# Patient Record
Sex: MALE | URBAN - METROPOLITAN AREA
[De-identification: names, ages, dates, MRNs, and addresses within clinical notes are randomized per-mention and may not be internally consistent; named-entity substitution may affect disease eponyms.]

---

## 2017-11-08 ENCOUNTER — APPOINTMENT (OUTPATIENT)
Dept: URBAN - METROPOLITAN AREA SURGERY 9 | Age: 57
Setting detail: DERMATOLOGY
End: 2017-11-08

## 2017-11-08 VITALS — SYSTOLIC BLOOD PRESSURE: 136 MMHG | RESPIRATION RATE: 12 BRPM | HEART RATE: 62 BPM | DIASTOLIC BLOOD PRESSURE: 84 MMHG

## 2017-11-08 PROBLEM — C44.311 BASAL CELL CARCINOMA OF SKIN OF NOSE: Status: ACTIVE | Noted: 2017-11-08

## 2017-11-08 PROBLEM — Z85.828 PERSONAL HISTORY OF OTHER MALIGNANT NEOPLASM OF SKIN: Status: ACTIVE | Noted: 2017-11-08

## 2017-11-08 PROCEDURE — 17311 MOHS 1 STAGE H/N/HF/G: CPT

## 2017-11-08 PROCEDURE — OTHER MOHS SURGERY: OTHER

## 2017-11-08 PROCEDURE — OTHER RETURN TO REFERRING PROVIDER: OTHER

## 2017-11-08 PROCEDURE — OTHER CONSULTATION FOR MOHS SURGERY: OTHER

## 2017-11-08 NOTE — PROCEDURE: MOHS SURGERY
Body Location Override (Optional - Billing Will Still Be Based On Selected Body Map Location If Applicable): Left nasal sidewall

## 2017-11-08 NOTE — HPI: MOHS SURGERY CONSULTATION
Has The Cancer Been Biopsied Before?: has been previously biopsied
Who Is Your Referring Provider?: Dr. Galarza
When Was Your Biopsy?: 10/18/2017

## 2021-06-25 NOTE — PROCEDURE: MOHS SURGERY
Completed  Requested Prescriptions     Signed Prescriptions Disp Refills     HYDROcodone-acetaminophen (NORCO )  mg per tablet 40 tablet 0     Sig: Take 1 tablet by mouth every 4 (four) hours as needed for pain (max of 4/day and 40/28days).     Authorizing Provider: DANNI LYNCH        Purse String (Simple) Text: Given the location of the defect and the characteristics of the surrounding skin a pursestring closure was deemed most appropriate.  Undermining was performed circumfirentially around the surgical defect.  A purstring suture was then placed and tightened.

## 2025-06-02 NOTE — PROCEDURE: MOHS SURGERY
declines Skin Substitute Text: The defect edges were debeveled with a #15 scalpel blade.  Given the location of the defect, shape of the defect and the proximity to free margins a skin substitute graft was deemed most appropriate.  The graft material was trimmed to fit the size of the defect. The graft was then placed in the primary defect and oriented appropriately.